# Patient Record
Sex: MALE | Race: WHITE | URBAN - METROPOLITAN AREA
[De-identification: names, ages, dates, MRNs, and addresses within clinical notes are randomized per-mention and may not be internally consistent; named-entity substitution may affect disease eponyms.]

---

## 2017-07-28 ENCOUNTER — OFFICE VISIT (OUTPATIENT)
Dept: FAMILY MEDICINE CLINIC | Age: 37
End: 2017-07-28
Payer: COMMERCIAL

## 2017-07-28 VITALS
TEMPERATURE: 97 F | BODY MASS INDEX: 21.07 KG/M2 | DIASTOLIC BLOOD PRESSURE: 66 MMHG | HEIGHT: 74 IN | WEIGHT: 164.2 LBS | SYSTOLIC BLOOD PRESSURE: 118 MMHG | HEART RATE: 58 BPM | OXYGEN SATURATION: 98 %

## 2017-07-28 DIAGNOSIS — Z23 NEED FOR TDAP VACCINATION: ICD-10-CM

## 2017-07-28 DIAGNOSIS — M72.0 DUPUYTREN'S CONTRACTURE OF RIGHT HAND: Primary | ICD-10-CM

## 2017-07-28 DIAGNOSIS — Z13.220 ENCOUNTER FOR SCREENING FOR LIPID DISORDER: ICD-10-CM

## 2017-07-28 DIAGNOSIS — Z76.89 ESTABLISHING CARE WITH NEW DOCTOR, ENCOUNTER FOR: ICD-10-CM

## 2017-07-28 DIAGNOSIS — Z00.00 ROUTINE GENERAL MEDICAL EXAMINATION AT A HEALTH CARE FACILITY: ICD-10-CM

## 2017-07-28 PROCEDURE — 99203 OFFICE O/P NEW LOW 30 MIN: CPT | Performed by: NURSE PRACTITIONER

## 2017-07-28 ASSESSMENT — ENCOUNTER SYMPTOMS
NAUSEA: 0
ABDOMINAL PAIN: 0
SHORTNESS OF BREATH: 0
COUGH: 0
VOMITING: 0
CHEST TIGHTNESS: 0

## 2017-07-28 ASSESSMENT — PATIENT HEALTH QUESTIONNAIRE - PHQ9
2. FEELING DOWN, DEPRESSED OR HOPELESS: 0
SUM OF ALL RESPONSES TO PHQ9 QUESTIONS 1 & 2: 0
SUM OF ALL RESPONSES TO PHQ QUESTIONS 1-9: 0
1. LITTLE INTEREST OR PLEASURE IN DOING THINGS: 0

## 2017-07-29 LAB
ALBUMIN SERPL-MCNC: 4.2 G/DL
ALP BLD-CCNC: 120 U/L
ALT SERPL-CCNC: 40 U/L
ANION GAP SERPL CALCULATED.3IONS-SCNC: NORMAL MMOL/L
AST SERPL-CCNC: 21 U/L
BASOPHILS ABSOLUTE: NORMAL /ΜL
BASOPHILS RELATIVE PERCENT: NORMAL %
BILIRUB SERPL-MCNC: 0.8 MG/DL (ref 0.1–1.4)
BUN BLDV-MCNC: 17 MG/DL
CALCIUM SERPL-MCNC: 9.7 MG/DL
CHLORIDE BLD-SCNC: 104 MMOL/L
CHOLESTEROL, TOTAL: 124 MG/DL
CHOLESTEROL/HDL RATIO: 3.4
CO2: 30 MMOL/L
CREAT SERPL-MCNC: 1.21 MG/DL
EOSINOPHILS ABSOLUTE: NORMAL /ΜL
EOSINOPHILS RELATIVE PERCENT: NORMAL %
GFR CALCULATED: NORMAL
GLUCOSE BLD-MCNC: 97 MG/DL
HCT VFR BLD CALC: NORMAL % (ref 41–53)
HDLC SERPL-MCNC: 37 MG/DL (ref 35–70)
HEMOGLOBIN: NORMAL G/DL (ref 13.5–17.5)
LDL CHOLESTEROL CALCULATED: 60 MG/DL (ref 0–160)
LYMPHOCYTES ABSOLUTE: NORMAL /ΜL
LYMPHOCYTES RELATIVE PERCENT: NORMAL %
MCH RBC QN AUTO: NORMAL PG
MCHC RBC AUTO-ENTMCNC: NORMAL G/DL
MCV RBC AUTO: NORMAL FL
MONOCYTES ABSOLUTE: NORMAL /ΜL
MONOCYTES RELATIVE PERCENT: NORMAL %
NEUTROPHILS ABSOLUTE: NORMAL /ΜL
NEUTROPHILS RELATIVE PERCENT: NORMAL %
PDW BLD-RTO: NORMAL %
PLATELET # BLD: NORMAL K/ΜL
PMV BLD AUTO: NORMAL FL
POTASSIUM SERPL-SCNC: 5 MMOL/L
RBC # BLD: NORMAL 10^6/ΜL
SODIUM BLD-SCNC: 141 MMOL/L
TOTAL PROTEIN: 7.1
TRIGL SERPL-MCNC: 135 MG/DL
VLDLC SERPL CALC-MCNC: 27 MG/DL
WBC # BLD: NORMAL 10^3/ML

## 2017-07-31 DIAGNOSIS — Z00.00 ROUTINE GENERAL MEDICAL EXAMINATION AT A HEALTH CARE FACILITY: ICD-10-CM

## 2017-07-31 DIAGNOSIS — Z13.220 ENCOUNTER FOR SCREENING FOR LIPID DISORDER: ICD-10-CM

## 2017-11-10 ENCOUNTER — OFFICE VISIT (OUTPATIENT)
Dept: FAMILY MEDICINE CLINIC | Age: 37
End: 2017-11-10
Payer: COMMERCIAL

## 2017-11-10 VITALS
WEIGHT: 166 LBS | HEIGHT: 74 IN | HEART RATE: 71 BPM | TEMPERATURE: 97 F | BODY MASS INDEX: 21.3 KG/M2 | SYSTOLIC BLOOD PRESSURE: 112 MMHG | DIASTOLIC BLOOD PRESSURE: 68 MMHG | OXYGEN SATURATION: 97 %

## 2017-11-10 DIAGNOSIS — Z23 FLU VACCINE NEED: ICD-10-CM

## 2017-11-10 DIAGNOSIS — H10.32 ACUTE CONJUNCTIVITIS OF LEFT EYE, UNSPECIFIED ACUTE CONJUNCTIVITIS TYPE: Primary | ICD-10-CM

## 2017-11-10 DIAGNOSIS — Z23 NEED FOR DIPHTHERIA-TETANUS-PERTUSSIS (TDAP) VACCINE, ADULT/ADOLESCENT: ICD-10-CM

## 2017-11-10 DIAGNOSIS — R59.0 LYMPHADENOPATHY, PREAURICULAR: ICD-10-CM

## 2017-11-10 PROCEDURE — 99213 OFFICE O/P EST LOW 20 MIN: CPT | Performed by: NURSE PRACTITIONER

## 2017-11-10 PROCEDURE — 90715 TDAP VACCINE 7 YRS/> IM: CPT | Performed by: NURSE PRACTITIONER

## 2017-11-10 PROCEDURE — 90630 INFLUENZA, QUADV, 18-64 YRS, ID, PF, MICRO INJ, 0.1ML (FLUZONE QUADV, PF): CPT | Performed by: NURSE PRACTITIONER

## 2017-11-10 PROCEDURE — 90471 IMMUNIZATION ADMIN: CPT | Performed by: NURSE PRACTITIONER

## 2017-11-10 PROCEDURE — 90472 IMMUNIZATION ADMIN EACH ADD: CPT | Performed by: NURSE PRACTITIONER

## 2017-11-10 RX ORDER — POLYMYXIN B SULFATE AND TRIMETHOPRIM 1; 10000 MG/ML; [USP'U]/ML
1 SOLUTION OPHTHALMIC EVERY 4 HOURS
Qty: 10 ML | Refills: 0 | Status: SHIPPED | OUTPATIENT
Start: 2017-11-10 | End: 2017-11-15

## 2017-11-10 RX ORDER — HYDROCODONE BITARTRATE AND ACETAMINOPHEN 5; 325 MG/1; MG/1
TABLET ORAL
COMMUNITY
Start: 2017-10-09 | End: 2017-11-13 | Stop reason: ALTCHOICE

## 2017-11-10 RX ORDER — CEPHALEXIN 500 MG/1
500 CAPSULE ORAL 2 TIMES DAILY
Qty: 20 CAPSULE | Refills: 0 | Status: SHIPPED | OUTPATIENT
Start: 2017-11-10 | End: 2017-11-20

## 2017-11-10 ASSESSMENT — ENCOUNTER SYMPTOMS
TROUBLE SWALLOWING: 0
BLURRED VISION: 0
PHOTOPHOBIA: 0
SINUS PRESSURE: 0
EYE REDNESS: 1
SHORTNESS OF BREATH: 0
EYE ITCHING: 1
EYE PAIN: 1
DOUBLE VISION: 0
VOMITING: 0
COUGH: 0
EYE DISCHARGE: 1
SINUS PAIN: 0
NAUSEA: 0
RHINORRHEA: 0

## 2017-11-10 NOTE — PROGRESS NOTES
(1) 09/01/2017    HIV screen  07/13/2018 (Originally 10/1/1993)       Subjective:      Review of Systems   Constitutional: Negative for activity change, chills, fatigue and fever. HENT: Negative for congestion, postnasal drip, rhinorrhea, sinus pain, sinus pressure and trouble swallowing. Eyes: Positive for pain, discharge, redness and itching. Negative for blurred vision, double vision, photophobia and visual disturbance. Respiratory: Negative for cough and shortness of breath. Cardiovascular: Negative for chest pain. Gastrointestinal: Negative for nausea and vomiting. Neurological: Negative for dizziness, light-headedness and headaches. Hematological: Positive for adenopathy. Objective:     Physical Exam   Constitutional: He is oriented to person, place, and time. He appears well-developed and well-nourished. No distress. HENT:   Head: Normocephalic and atraumatic. Eyes: EOM and lids are normal. Pupils are equal, round, and reactive to light. Lids are everted and swept, no foreign bodies found. Left eye exhibits no exudate and no hordeolum. No foreign body present in the left eye. Left conjunctiva is injected. Left conjunctiva has no hemorrhage. Bilateral lids erythematous with mild swelling upper lip   Neck: Neck supple. Lymphadenopathy:        Head (left side): Preauricular adenopathy present. He has no cervical adenopathy. Neurological: He is alert and oriented to person, place, and time. Skin: Skin is warm and dry. Psychiatric: He has a normal mood and affect. His behavior is normal.   Nursing note and vitals reviewed. Assessment:      1. Acute conjunctivitis of left eye, unspecified acute conjunctivitis type  trimethoprim-polymyxin b (POLYTRIM) 52425-3.1 UNIT/ML-% ophthalmic solution   2. Lymphadenopathy, preauricular  cephALEXin (KEFLEX) 500 MG capsule   3. Flu vaccine need  INFLUENZA, QUADV, 18-64 YRS, ID, PF, MICRO INJ, 0.1ML (FLUZONE QUADV, PF)   4.  Need for diphtheria-tetanus-pertussis (Tdap) vaccine, adult/adolescent  Tdap (age 10y-63y) IM (ADACEL)       Plan:      No Follow-up on file. Orders Placed This Encounter   Procedures    INFLUENZA, QUADV, 18-64 YRS, ID, PF, MICRO INJ, 0.1ML (FLUZONE QUADV, PF)    Tdap (age 10y-63y) IM (ADACEL)     Orders Placed This Encounter   Medications    trimethoprim-polymyxin b (POLYTRIM) 05401-3.1 UNIT/ML-% ophthalmic solution     Sig: Place 1 drop into the left eye every 4 hours for 5 days     Dispense:  10 mL     Refill:  0    cephALEXin (KEFLEX) 500 MG capsule     Sig: Take 1 capsule by mouth 2 times daily for 10 days     Dispense:  20 capsule     Refill:  0     Health Maintenance reviewed - tetanus booster given, Flu shot given. Frequent handwashing  Cool compresses to eyes  otc allergy pill QD  Drops to start and if worsening or new swelling/pain, increased adenopathy start keflex for possible periorbital cellulitis. Call INB or worsening    Patient given educational materials - see patient instructions. Discussed use, benefit, and side effects of prescribed medications. All patient questions answered. Pt voiced understanding. Reviewed health maintenance. Instructed to continue current medications, diet and exercise.     Electronically signed by Fracisco Burger CNP on 11/10/2017 at 1:43 PM

## 2017-11-10 NOTE — PROGRESS NOTES
Visit Information    Have you changed or started any medications since your last visit including any over-the-counter medicines, vitamins, or herbal medicines? no   Have you stopped taking any of your medications? Is so, why? -  no  Are you having any side effects from any of your medications? - no    Have you seen any other physician or provider since your last visit?  no   Have you had any other diagnostic tests since your last visit?  no   Have you been seen in the emergency room and/or had an admission in a hospital since we last saw you?  no   Have you had your routine dental cleaning in the past 6 months?  no     Do you have an active MyChart account? If no, what is the barrier? No: na    Patient Care Team:  Andrew Go CNP as PCP - General (Nurse Practitioner)    Medical History Review  Past Medical, Family, and Social History reviewed and  contribute to the patient presenting condition    Health Maintenance   Topic Date Due    DTaP/Tdap/Td vaccine (1 - Tdap) 10/01/1997    Flu vaccine (1) 09/01/2017    HIV screen  07/13/2018 (Originally 10/1/1993)     After obtaining consent, and per orders of Anne Mtz CNP, injection of flu vaccine and Tdap given in Left deltoid and Right deltoid by Yobany Rocha. Patient instructed to remain in clinic for 20 minutes afterwards, and to report any adverse reaction to me immediately.

## 2017-11-13 ENCOUNTER — OFFICE VISIT (OUTPATIENT)
Dept: FAMILY MEDICINE CLINIC | Age: 37
End: 2017-11-13
Payer: COMMERCIAL

## 2017-11-13 VITALS
HEIGHT: 74 IN | TEMPERATURE: 96.8 F | SYSTOLIC BLOOD PRESSURE: 122 MMHG | DIASTOLIC BLOOD PRESSURE: 60 MMHG | OXYGEN SATURATION: 98 % | HEART RATE: 68 BPM | BODY MASS INDEX: 21.3 KG/M2 | RESPIRATION RATE: 18 BRPM | WEIGHT: 166.01 LBS

## 2017-11-13 DIAGNOSIS — H57.89 REDNESS OF EYE, LEFT: ICD-10-CM

## 2017-11-13 DIAGNOSIS — B02.30 HERPES ZOSTER WITH OPHTHALMIC COMPLICATION, UNSPECIFIED HERPES ZOSTER EYE DISEASE: Primary | ICD-10-CM

## 2017-11-13 PROCEDURE — 99213 OFFICE O/P EST LOW 20 MIN: CPT | Performed by: INTERNAL MEDICINE

## 2017-11-13 RX ORDER — LORATADINE 10 MG/1
10 TABLET ORAL DAILY
COMMUNITY

## 2017-11-13 RX ORDER — VALACYCLOVIR HYDROCHLORIDE 1 G/1
1000 TABLET, FILM COATED ORAL 3 TIMES DAILY
Qty: 21 TABLET | Refills: 0 | Status: SHIPPED | OUTPATIENT
Start: 2017-11-13

## 2017-11-13 ASSESSMENT — ENCOUNTER SYMPTOMS
NAUSEA: 0
EYE REDNESS: 1
EYE ITCHING: 1
ABDOMINAL PAIN: 0
EYE DISCHARGE: 0
CONSTIPATION: 0
RHINORRHEA: 0
SHORTNESS OF BREATH: 0
SINUS PRESSURE: 0
FOREIGN BODY SENSATION: 0
FACIAL SWELLING: 1
TROUBLE SWALLOWING: 0
VOMITING: 0
SORE THROAT: 0
DOUBLE VISION: 0
SINUS PAIN: 0
BLURRED VISION: 0
PHOTOPHOBIA: 0
DIARRHEA: 0
VOICE CHANGE: 0
NAIL CHANGES: 0
EYE PAIN: 1
COUGH: 0

## 2017-11-13 ASSESSMENT — PATIENT HEALTH QUESTIONNAIRE - PHQ9
2. FEELING DOWN, DEPRESSED OR HOPELESS: 0
SUM OF ALL RESPONSES TO PHQ QUESTIONS 1-9: 0
SUM OF ALL RESPONSES TO PHQ9 QUESTIONS 1 & 2: 0
1. LITTLE INTEREST OR PLEASURE IN DOING THINGS: 0

## 2017-11-13 NOTE — PROGRESS NOTES
Visit Information    Have you changed or started any medications since your last visit including any over-the-counter medicines, vitamins, or herbal medicines? no   Are you having any side effects from any of your medications? -  no  Have you stopped taking any of your medications? Is so, why? -  no    Have you seen any other physician or provider since your last visit? No  Have you had any other diagnostic tests since your last visit? No  Have you been seen in the emergency room and/or had an admission to a hospital since we last saw you? No  Have you had your routine dental cleaning in the past 6 months? no    Have you activated your [employer] account? If not, what are your barriers? No: pending     Patient Care Team:  Rj Trujillo CNP as PCP - General (Nurse Practitioner)    Medical History Review  Past Medical, Family, and Social History reviewed and does not contribute to the patient presenting condition    Health Maintenance   Topic Date Due    HIV screen  07/13/2018 (Originally 10[birth year]1/1993)    DTaP/Tdap/Td vaccine (2 - Td) 11/10/2027    Flu vaccine  Completed     Michaelle Osuna is a 42 y.o. male who presents today for his medical conditions/complaints as noted below. Michaelle Osuna is c/o of   Chief Complaint   Patient presents with    Eye Drainage     ongoing    Rash     around left eye started sat night     Facial Swelling     arounf left eye          HPI:     Started last wednesday with redness an tearing . Ezio Khan on Friday he states he had increased redness and swelling around the eye at that time but no rash at that time I have attempted without success to contact this patient by phone to will ask a nurse to try later. Real pain , slightly tender. Was given antibiotic eyedrops/ointment and keflex to start the day after if symptoms worsened. Pt states it was worse Saturday so he started the oral abx Keflex. He noticed the rash late sat/yesterday and has worsened.  He states nothing he has done so far has helped and he feels like he is actually overall worse.e He denies any pain to the rash , no burning or itching but rash does have blisters. No rash anywhere else on body . No SOB, tongue swelling or throat swelling. Has had chiken pox       Rash   This is a new problem. The current episode started in the past 7 days. The problem has been gradually worsening since onset. The affected locations include the face. The rash is characterized by blistering and redness. He was exposed to nothing. Associated symptoms include eye pain and facial edema. Pertinent negatives include no anorexia, congestion, cough, diarrhea, fatigue, fever, joint pain, nail changes, rhinorrhea, shortness of breath, sore throat or vomiting. Past treatments include cold compress, antihistamine, antibiotics, antibiotic cream and analgesics. The treatment provided no relief. His past medical history is significant for varicella. There is no history of allergies, asthma or eczema. Eye Problem    The left eye is affected. This is a new problem. The current episode started 1 to 4 weeks ago. The problem occurs constantly. The problem has been gradually worsening. There was no injury mechanism. The pain is at a severity of 2/10. There is no known exposure to pink eye. Associated symptoms include eye redness and itching. Pertinent negatives include no blurred vision, eye discharge, double vision, fever, foreign body sensation, nausea, photophobia, recent URI or vomiting. He has tried eye drops for the symptoms. The treatment provided no relief.        No results found for: LABA1C          ( goal A1C is < 7)   No results found for: LABMICR  LDL Calculated (mg/dL)   Date Value   07/28/2017 60       (goal LDL is <100)   AST (U/L)   Date Value   07/28/2017 21     ALT (U/L)   Date Value   07/28/2017 40     BUN (mg/dL)   Date Value   07/28/2017 17     BP Readings from Last 3 Encounters:   11/13/17 122/60   11/10/17 112/68   07/28/17 118/66 petechiae noted. Rash is pustular and vesicular. He is not diaphoretic. There is erythema. No pallor. Blistering , red rash /vesicular which does not cross dermatomal border and is restricted to left side of forehead /face. Some periorbital swelling, minimal .    Psychiatric: He has a normal mood and affect. Nursing note and vitals reviewed. /60 (Site: Left Arm, Position: Sitting, Cuff Size: Large Adult)   Pulse 68   Temp 96.8 °F (36 °C) (Tympanic)   Resp 18   Ht 6' 2.02\" (1.88 m)   Wt 166 lb 0.1 oz (75.3 kg)   SpO2 98%   BMI 21.30 kg/m²     Assessment:      1. Herpes zoster with ophthalmic complication, unspecified herpes zoster eye disease     2. Redness of eye, left               Plan:      Return in about 2 weeks (around 11/27/2017) for shingles recheck . No orders of the defined types were placed in this encounter. Orders Placed This Encounter   Medications    valACYclovir (VALTREX) 1 g tablet     Sig: Take 1 tablet by mouth 3 times daily For shingles     Dispense:  21 tablet     Refill:  0    We will send you over/get you in with Opthalmology this afternoon as herpes to the eye can permanently affect your vision so just need to get the specialist opinion. Stop all the antibiotic treatments for now. Start the valtrex as prescribed. Pt verbalizes understanding. No pain so will hold off on lidocaine or other neuropathic treatment for shingles. Reviewed this is contagious given wife is pregnant and baby's due date is tomorrow although currently patient's wife is not in active labor/not having contractions. Will need to see you back in 1-2 weeks also to check sxs/rash. Call if any issues or any questions/concerns. Patient given educational materials - see patient instructions. Discussed use, benefit, and side effects of prescribed medications. All patient questions answered. Pt voiced understanding. Reviewed health maintenance.   Instructed to continue current

## 2017-11-13 NOTE — PROGRESS NOTES
history of allergies, asthma or eczema. Eye Problem    The left eye is affected. This is a new problem. The current episode started 1 to 4 weeks ago. The problem occurs constantly. The problem has been gradually worsening. There was no injury mechanism. The pain is at a severity of 2/10. There is no known exposure to pink eye. Associated symptoms include eye redness and itching. Pertinent negatives include no blurred vision, eye discharge, double vision, fever, foreign body sensation, nausea, photophobia, recent URI or vomiting. He has tried eye drops for the symptoms. The treatment provided no relief.        No results found for: LABA1C          ( goal A1C is < 7)   No results found for: LABMICR  LDL Calculated (mg/dL)   Date Value   07/28/2017 60       (goal LDL is <100)   AST (U/L)   Date Value   07/28/2017 21     ALT (U/L)   Date Value   07/28/2017 40     BUN (mg/dL)   Date Value   07/28/2017 17     BP Readings from Last 3 Encounters:   11/13/17 122/60   11/10/17 112/68   07/28/17 118/66          (goal 120/80)    Past Medical History:   Diagnosis Date    Dupuytren's contracture of right hand 7/28/2017      Past Surgical History:   Procedure Laterality Date    FINGER SURGERY Right 10/09/2017    fasciectomy       Family History   Problem Relation Age of Onset    Diabetes Father        Social History   Substance Use Topics    Smoking status: Never Smoker    Smokeless tobacco: Never Used    Alcohol use No      Current Outpatient Prescriptions   Medication Sig Dispense Refill    loratadine (CLARITIN) 10 MG tablet Take 10 mg by mouth daily      valACYclovir (VALTREX) 1 g tablet Take 1 tablet by mouth 3 times daily For shingles 21 tablet 0    trimethoprim-polymyxin b (POLYTRIM) 13199-4.1 UNIT/ML-% ophthalmic solution Place 1 drop into the left eye every 4 hours for 5 days 10 mL 0    cephALEXin (KEFLEX) 500 MG capsule Take 1 capsule by mouth 2 times daily for 10 days 20 capsule 0     No current facility-administered medications for this visit. Allergies   Allergen Reactions    Ampicillin        Health Maintenance   Topic Date Due    HIV screen  07/13/2018 (Originally [birth year]1/1995)    DTaP/Tdap/Td vaccine (2 - Td) 11/10/2027    Flu vaccine  Completed       Subjective:      Review of Systems   Constitutional: Negative for activity change, appetite change, chills, diaphoresis, fatigue, fever and unexpected weight change. HENT: Positive for facial swelling. Negative for congestion, ear discharge, ear pain, rhinorrhea, sinus pain, sinus pressure, sore throat, trouble swallowing and voice change. Eyes: Positive for pain, redness and itching. Negative for blurred vision, double vision, photophobia, discharge and visual disturbance. Respiratory: Negative for cough and shortness of breath. Cardiovascular: Negative for chest pain. Gastrointestinal: Negative for abdominal pain, anorexia, constipation, diarrhea, nausea and vomiting. Musculoskeletal: Negative for arthralgias and joint pain. Skin: Positive for rash. Negative for nail changes. Allergic/Immunologic: Negative for environmental allergies. Neurological: Negative for headaches. Hematological: Negative for adenopathy. Does not bruise/bleed easily. Psychiatric/Behavioral: Negative for sleep disturbance. Objective:     Physical Exam   Constitutional: He is oriented to person, place, and time. He appears well-developed and well-nourished. Non-toxic appearance. He does not have a sickly appearance. He does not appear ill. No distress. HENT:   Right Ear: External ear normal.   Left Ear: External ear normal.   Nose: Nose normal.   Eyes: EOM are normal. Right eye exhibits no chemosis, no discharge, no exudate and no hordeolum. No foreign body present in the right eye. Left eye exhibits discharge. Left eye exhibits no chemosis, no exudate and no hordeolum. No foreign body present in the left eye.  Right conjunctiva is not injected. Right conjunctiva has no hemorrhage. Left conjunctiva is injected. Left conjunctiva has a hemorrhage. No scleral icterus. Right eye exhibits normal extraocular motion and no nystagmus. Left eye exhibits normal extraocular motion and no nystagmus. Fundoscopic exam:       The right eye shows no hemorrhage. The left eye shows hemorrhage. The left eye shows no exudate and no papilledema. Slit lamp exam:       The left eye shows corneal abrasion. Cardiovascular: Normal rate and regular rhythm. Exam reveals gallop. Exam reveals no friction rub. Murmur heard. Pulmonary/Chest: Effort normal and breath sounds normal.   Abdominal: Soft. Bowel sounds are normal. There is no splenomegaly or hepatomegaly. There is no tenderness. Lymphadenopathy:     He has no cervical adenopathy. Neurological: He is alert and oriented to person, place, and time. No cranial nerve deficit. Skin: Skin is warm and dry. Rash noted. No abrasion, no bruising, no burn, no ecchymosis, no laceration, no lesion and no petechiae noted. Rash is pustular and vesicular. He is not diaphoretic. There is erythema. No pallor. Blistering , red rash /vesicular which does not cross dermatomal border and is restricted to left side of forehead /face. Some periorbital swelling, minimal .    Psychiatric: He has a normal mood and affect. Nursing note and vitals reviewed. /60 (Site: Left Arm, Position: Sitting, Cuff Size: Large Adult)   Pulse 68   Temp 96.8 °F (36 °C) (Tympanic)   Resp 18   Ht 6' 2.02\" (1.88 m)   Wt 166 lb 0.1 oz (75.3 kg)   SpO2 98%   BMI 21.30 kg/m²     Assessment:      1. Herpes zoster with ophthalmic complication, unspecified herpes zoster eye disease     2. Redness of eye, left               Plan:      Return in about 2 weeks (around 11/27/2017) for shingles recheck . No orders of the defined types were placed in this encounter.     Orders Placed This Encounter   Medications    valACYclovir (VALTREX) 1 g tablet     Sig: Take 1 tablet by mouth 3 times daily For shingles     Dispense:  21 tablet     Refill:  0    We will send you over/get you in with Opthalmology this afternoon as herpes to the eye can permanently affect your vision so just need to get the specialist opinion. Stop all the antibiotic treatments for now. Start the valtrex as prescribed. Pt verbalizes understanding. No pain so will hold off on lidocaine or other neuropathic treatment for shingles. Reviewed this is contagious given wife is pregnant and baby's due date is tomorrow although currently patient's wife is not in active labor/not having contractions. Will need to see you back in 1-2 weeks also to check sxs/rash. Call if any issues or any questions/concerns. Patient given educational materials - see patient instructions. Discussed use, benefit, and side effects of prescribed medications. All patient questions answered. Pt voiced understanding. Reviewed health maintenance. Instructed to continue current medications, diet and exercise. Patient agreed with treatment plan. Follow up as directed.      Electronically signed by Annalee Dempsey DO on 11/13/2017 at 3:10 PM

## 2017-11-13 NOTE — PROGRESS NOTES
history of allergies, asthma or eczema. Eye Problem    The left eye is affected. This is a new problem. The current episode started 1 to 4 weeks ago. The problem occurs constantly. The problem has been gradually worsening. There was no injury mechanism. The pain is at a severity of 2/10. There is no known exposure to pink eye. Associated symptoms include eye redness and itching. Pertinent negatives include no blurred vision, eye discharge, double vision, fever, foreign body sensation, nausea, photophobia, recent URI or vomiting. He has tried eye drops for the symptoms. The treatment provided no relief.        No results found for: LABA1C          ( goal A1C is < 7)   No results found for: LABMICR  LDL Calculated (mg/dL)   Date Value   07/28/2017 60       (goal LDL is <100)   AST (U/L)   Date Value   07/28/2017 21     ALT (U/L)   Date Value   07/28/2017 40     BUN (mg/dL)   Date Value   07/28/2017 17     BP Readings from Last 3 Encounters:   11/13/17 122/60   11/10/17 112/68   07/28/17 118/66          (goal 120/80)    Past Medical History:   Diagnosis Date    Dupuytren's contracture of right hand 7/28/2017      Past Surgical History:   Procedure Laterality Date    FINGER SURGERY Right 10/09/2017    fasciectomy       Family History   Problem Relation Age of Onset    Diabetes Father        Social History   Substance Use Topics    Smoking status: Never Smoker    Smokeless tobacco: Never Used    Alcohol use No      Current Outpatient Prescriptions   Medication Sig Dispense Refill    loratadine (CLARITIN) 10 MG tablet Take 10 mg by mouth daily      valACYclovir (VALTREX) 1 g tablet Take 1 tablet by mouth 3 times daily For shingles 21 tablet 0    trimethoprim-polymyxin b (POLYTRIM) 42176-5.1 UNIT/ML-% ophthalmic solution Place 1 drop into the left eye every 4 hours for 5 days 10 mL 0    cephALEXin (KEFLEX) 500 MG capsule Take 1 capsule by mouth 2 times daily for 10 days 20 capsule 0     No current facility-administered medications for this visit. Allergies   Allergen Reactions    Ampicillin        Health Maintenance   Topic Date Due    HIV screen  07/13/2018 (Originally 10[birth year]1/1995)    DTaP/Tdap/Td vaccine (2 - Td) 11/10/2027    Flu vaccine  Completed       Subjective:      Review of Systems   Constitutional: Negative for activity change, appetite change, chills, diaphoresis, fatigue, fever and unexpected weight change. HENT: Positive for facial swelling. Negative for congestion, ear discharge, ear pain, rhinorrhea, sinus pain, sinus pressure, sore throat, trouble swallowing and voice change. Eyes: Positive for pain, redness and itching. Negative for blurred vision, double vision, photophobia, discharge and visual disturbance. Respiratory: Negative for cough and shortness of breath. Cardiovascular: Negative for chest pain. Gastrointestinal: Negative for abdominal pain, anorexia, constipation, diarrhea, nausea and vomiting. Musculoskeletal: Negative for arthralgias and joint pain. Skin: Positive for rash. Negative for nail changes. Allergic/Immunologic: Negative for environmental allergies. Neurological: Negative for headaches. Hematological: Negative for adenopathy. Does not bruise/bleed easily. Psychiatric/Behavioral: Negative for sleep disturbance. Objective:     Physical Exam   Constitutional: He is oriented to person, place, and time. He appears well-developed and well-nourished. Non-toxic appearance. He does not have a sickly appearance. He does not appear ill. No distress. HENT:   Right Ear: External ear normal.   Left Ear: External ear normal.   Nose: Nose normal.   Eyes: EOM are normal. Right eye exhibits no chemosis, no discharge, no exudate and no hordeolum. No foreign body present in the right eye. Left eye exhibits discharge. Left eye exhibits no chemosis, no exudate and no hordeolum. No foreign body present in the left eye.  Right conjunctiva is not tablet by mouth 3 times daily For shingles     Dispense:  21 tablet     Refill:  0    We will send you over/get you in with Opthalmology this afternoon as herpes to the eye can permanently affect your vision so just need to get the specialist opinion. Stop all the antibiotic treatments for now. Start the valtrex as prescribed. Pt verbalizes understanding. No pain so will hold off on lidocaine or other neuropathic treatment for shingles. Reviewed this is contagious given wife is pregnant and baby's due date is tomorrow although currently patient's wife is not in active labor/not having contractions. Will need to see you back in 1-2 weeks also to check sxs/rash. Call if any issues or any questions/concerns. Patient given educational materials - see patient instructions. Discussed use, benefit, and side effects of prescribed medications. All patient questions answered. Pt voiced understanding. Reviewed health maintenance. Instructed to continue current medications, diet and exercise. Patient agreed with treatment plan. Follow up as directed.      Electronically signed by Piero Osroio DO on 11/13/2017 at 2:18 PM